# Patient Record
(demographics unavailable — no encounter records)

---

## 2024-11-07 NOTE — ASSESSMENT
[FreeTextEntry1] : acute onset of right knee pain after a fall over a curb getting out of a car on 9/20/24.   reports knee went into valgus stress and then hit the ground.  mri 2024 shows proximal mcl sprain and inflammation around mcl.  improving with pt but still some discomfort.   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

## 2024-11-07 NOTE — PHYSICAL EXAM
[Right] : right knee [4___] : hamstring 4[unfilled]/5 [] : no erythema [FreeTextEntry8] : less but still present [de-identified] : able to slr [de-identified] : soreness with valgus stress [TWNoteComboBox7] : flexion 120 degrees [de-identified] : extension 0 degrees

## 2024-11-07 NOTE — DISCUSSION/SUMMARY
[de-identified] : The patient's orthopaedic condition(s) warrants consideration of consistent or intermittent use of a prescription strength non-steroidal anti-inflammatory medication.  These medications are associated with risks including but not limited to gastrointestinal irritation, kidney damage, hypertension, and bleeding. The patient notes they already have a valid prescription for the medication. The patient understands the risks and will take medications as prescribed previously.  The patient will stop the medication and consult a physician as needed if problems arise.  PT.  RTO in 6 weeks.   Progress Note completed by Eli Strong PA-C * Dr. Smith -- The documentation recorded in this note accurately reflects the decisions made by me during this visit.  I interviewed and examined the patient personally and oversaw all medical decisions.

## 2024-11-07 NOTE — HISTORY OF PRESENT ILLNESS
[5] : 5 [Dull/Aching] : dull/aching [de-identified] : 9/26/24: Rt knee pain after falling over curb when getting out of car. Had went to [facility] ER, had XR which was negative. Still issues with her ROM and WB 10/03/24:  Follow up right knee.  still some pain. 11/07/24 - pt is here for fu on right knee, pt stated that she is doing mostly better but sometimes feels a sharp pain in her knee. [FreeTextEntry1] : right knee

## 2025-04-15 NOTE — ASSESSMENT
[FreeTextEntry1] : Acute onset of right knee pain after a fall over a curb getting out of a car on 9/20/24.   Mri 2024 shows proximal mcl sprain and inflammation around mcl.  Was better then Increased pain since March 2025.  Mostly medial facet and pfs, but some medial joint line as well today.  Likely PF flare up, some concern for MMT.   At this point, this is a new condition where the diagnosis and the long-term prognosis is uncertain versus an exacerbation of a chronic condition.  Nurse  Denies pmhx

## 2025-04-15 NOTE — HISTORY OF PRESENT ILLNESS
[de-identified] : 9/26/24: Rt knee pain after falling over curb when getting out of car. Had went to [facility] ER, had XR which was negative. Still issues with her ROM and WB 10/03/24:  Follow up right knee.  still some pain. 11/07/24 - pt is here for fu on right knee, pt stated that she is doing mostly better but sometimes feels a sharp pain in her knee. 4/15/25:  Increased right knee pain since march 2025.  NO new injury.  Pain mostly in front of the knee.   Pain worse with walking. 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

## 2025-04-15 NOTE — DISCUSSION/SUMMARY
[de-identified] : The patient has one or more conditions that would benefit from physical therapy.  The physical therapy is requested to improve any deficit in pain, range of motion, strength and other problems in the affected body part(s) as noted in the physical examination above.  A prescription for physical therapy 2 - 3 times per week for 6 weeks was prescribed for the following body parts.  Right knee.   The patient should perform a home exercise program as directed. The patient should focus on the body parts affected as discussed above.  The patient's orthopaedic condition(s) warrants consideration of consistent or intermittent use of a prescription strength non-steroidal anti-inflammatory medication (IBUPROFEN 600MG po BID prn).  This medication is associated with risks including but not limited to gastrointestinal irritation, kidney damage, hypertension, and bleeding. The patient notes they already have a valid prescription for the medication. The patient understands the risks and will take medication as prescribed previously.  The patient will stop the medication and consult a physician as needed if problems arise.  Follow up in 6 weeks.   Will consider MRI if symptoms persist versus CSI
[de-identified] : The patient has one or more conditions that would benefit from physical therapy.  The physical therapy is requested to improve any deficit in pain, range of motion, strength and other problems in the affected body part(s) as noted in the physical examination above.  A prescription for physical therapy 2 - 3 times per week for 6 weeks was prescribed for the following body parts.  Right knee.   The patient should perform a home exercise program as directed. The patient should focus on the body parts affected as discussed above.  The patient's orthopaedic condition(s) warrants consideration of consistent or intermittent use of a prescription strength non-steroidal anti-inflammatory medication (IBUPROFEN 600MG po BID prn).  This medication is associated with risks including but not limited to gastrointestinal irritation, kidney damage, hypertension, and bleeding. The patient notes they already have a valid prescription for the medication. The patient understands the risks and will take medication as prescribed previously.  The patient will stop the medication and consult a physician as needed if problems arise.  Follow up in 6 weeks.   Will consider MRI if symptoms persist versus CSI
Statement Selected

## 2025-04-15 NOTE — IMAGING
[Right] : right knee [AP] : anteroposterior [Lateral] : lateral [[place]] : skyline [There are no fractures, subluxations or dislocations. No significant abnormalities are seen] : There are no fractures, subluxations or dislocations. No significant abnormalities are seen

## 2025-04-15 NOTE — PHYSICAL EXAM
[Right] : right knee [4___] : hamstring 4[unfilled]/5 [] : no erythema [FreeTextEntry8] : PFS >> medial joint [de-identified] : able to slr [de-identified] : soreness with valgus stress [TWNoteComboBox7] : flexion 120 degrees [de-identified] : extension 0 degrees

## 2025-04-15 NOTE — IMAGING
Counseling and Referral of Other Preventative  (Italic type indicates deductible and co-insurance are waived)    Patient Name: Susy Mullen  Today's Date: 1/15/2019    Health Maintenance       Date Due Completion Date    DEXA SCAN 10/18/2019 10/18/2017    Override on 11/5/2012: Done (Osteoporosis; stable to improved BMD since '10; high fx risk; repeat in 2-3 yrs)    Lipid Panel 10/24/2019 10/24/2018    TETANUS VACCINE 06/27/2027 6/27/2017        No orders of the defined types were placed in this encounter.    The following information is provided to all patients.  This information is to help you find resources for any of the problems found today that may be affecting your health:                Living healthy guide: www.[facility].[place].gov      Understanding Diabetes: www.diabetes.org      Eating healthy: www.cdc.gov/healthyweight      [facility] home safety checklist: www.cdc.gov/steadi/patient.html      Agency on Aging: www.goea.[place].gov      Alcoholics anonymous (AA): www.aa.org      Physical Activity: www.kiara.nih.gov/yv9mmqs      Tobacco use: www.quitwithusla.org      [Right] : right knee [AP] : anteroposterior [Lateral] : lateral [[facility]] : skyline [There are no fractures, subluxations or dislocations. No significant abnormalities are seen] : There are no fractures, subluxations or dislocations. No significant abnormalities are seen

## 2025-04-15 NOTE — PHYSICAL EXAM
[Right] : right knee [4___] : hamstring 4[unfilled]/5 [] : no erythema [FreeTextEntry8] : PFS >> medial joint [de-identified] : able to slr [de-identified] : soreness with valgus stress [TWNoteComboBox7] : flexion 120 degrees [de-identified] : extension 0 degrees

## 2025-04-15 NOTE — HISTORY OF PRESENT ILLNESS
[de-identified] : 9/26/24: Rt knee pain after falling over curb when getting out of car. Had went to [facility] ER, had XR which was negative. Still issues with her ROM and WB 10/03/24:  Follow up right knee.  still some pain. 11/07/24 - pt is here for fu on right knee, pt stated that she is doing mostly better but sometimes feels a sharp pain in her knee. 4/15/25:  Increased right knee pain since march 2025.  NO new injury.  Pain mostly in front of the knee.   Pain worse with walking. 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